# Patient Record
Sex: FEMALE | URBAN - METROPOLITAN AREA
[De-identification: names, ages, dates, MRNs, and addresses within clinical notes are randomized per-mention and may not be internally consistent; named-entity substitution may affect disease eponyms.]

---

## 2017-05-15 ENCOUNTER — TELEPHONE (OUTPATIENT)
Dept: BEHAVIORAL HEALTH | Facility: CLINIC | Age: 22
End: 2017-05-15

## 2017-05-15 ENCOUNTER — HOSPITAL ENCOUNTER (INPATIENT)
Facility: CLINIC | Age: 22
End: 2017-05-15
Attending: PSYCHIATRY & NEUROLOGY | Admitting: PSYCHIATRY & NEUROLOGY
Payer: COMMERCIAL

## 2017-05-15 NOTE — TELEPHONE ENCOUNTER
R: staff from [facility] er called and said pt is now refusing admit and they do not feel she is holdable. Admit cancelled. brooks

## 2017-05-15 NOTE — TELEPHONE ENCOUNTER
S: Fabiana gave clinical ([phone]) saying pt was bib her mom to [facility] er today due to increased dep in past 2 wks and SI w/ plan to cut herself.  B: She reports she has had SI for 2 wks. She says she has been drinking daily for the past 2 wks, to intoxication. No hx of seizures. Bal is less than 10. Utox neg. Pt admits to using marij last night. No hx of cd tx. Stressor is her good friend  a few weeks ago. Hx of psych admit in 2013 at [facility]. Hx of 4 SA's by cutting and od'ing. No chronic med prob's   A: coop, pleasant, tearful, SI, med cleared, vol  R: #4awest/minh accepted for herself                brooks